# Patient Record
Sex: MALE | Race: WHITE | NOT HISPANIC OR LATINO | URBAN - METROPOLITAN AREA
[De-identification: names, ages, dates, MRNs, and addresses within clinical notes are randomized per-mention and may not be internally consistent; named-entity substitution may affect disease eponyms.]

---

## 2017-09-01 ENCOUNTER — ALLSCRIPTS OFFICE VISIT (OUTPATIENT)
Dept: OTHER | Facility: OTHER | Age: 72
End: 2017-09-01

## 2017-09-01 LAB
BILIRUB UR QL STRIP: NORMAL
CLARITY UR: NORMAL
COLOR UR: YELLOW
GLUCOSE (HISTORICAL): NORMAL
HGB UR QL STRIP.AUTO: NORMAL
KETONES UR STRIP-MCNC: NORMAL MG/DL
LEUKOCYTE ESTERASE UR QL STRIP: NORMAL
NITRITE UR QL STRIP: NORMAL
PH UR STRIP.AUTO: 5.5 [PH]
PROT UR STRIP-MCNC: NORMAL MG/DL
SP GR UR STRIP.AUTO: 1.02
UROBILINOGEN UR QL STRIP.AUTO: 0.2

## 2017-10-13 DIAGNOSIS — N40.1 ENLARGED PROSTATE WITH LOWER URINARY TRACT SYMPTOMS (LUTS): ICD-10-CM

## 2018-01-14 VITALS
WEIGHT: 187 LBS | SYSTOLIC BLOOD PRESSURE: 118 MMHG | BODY MASS INDEX: 25.33 KG/M2 | HEIGHT: 72 IN | DIASTOLIC BLOOD PRESSURE: 70 MMHG

## 2019-10-14 DIAGNOSIS — N40.1 ENLARGED PROSTATE WITH LOWER URINARY TRACT SYMPTOMS (LUTS): ICD-10-CM

## 2025-04-29 PROBLEM — H90.3 SENSORINEURAL HEARING LOSS (SNHL) OF BOTH EARS: Status: ACTIVE | Noted: 2024-10-07

## 2025-04-29 PROBLEM — N40.0 BENIGN PROSTATIC HYPERPLASIA: Status: ACTIVE | Noted: 2025-04-29

## 2025-04-29 PROBLEM — G47.33 OBSTRUCTIVE SLEEP APNEA: Status: ACTIVE | Noted: 2020-06-18

## 2025-04-29 PROBLEM — F11.90 CHRONIC, CONTINUOUS USE OF OPIOIDS: Status: ACTIVE | Noted: 2017-10-24

## 2025-04-29 PROBLEM — I48.0 PAROXYSMAL ATRIAL FIBRILLATION (HCC): Status: ACTIVE | Noted: 2019-09-09

## 2025-04-29 PROBLEM — Z97.8 PRESENCE OF INTRATHECAL PUMP: Status: ACTIVE | Noted: 2022-03-24

## 2025-04-29 PROBLEM — I72.3 COMMON ILIAC ANEURYSM (HCC): Status: ACTIVE | Noted: 2020-09-23

## 2025-04-29 PROBLEM — G62.9 NEUROPATHY: Status: ACTIVE | Noted: 2025-04-29

## 2025-06-19 ENCOUNTER — HOSPITAL ENCOUNTER (OUTPATIENT)
Dept: GASTROENTEROLOGY | Facility: HOSPITAL | Age: 80
Setting detail: OUTPATIENT SURGERY
Discharge: HOME | End: 2025-06-19
Attending: INTERNAL MEDICINE
Payer: COMMERCIAL

## 2025-06-19 ENCOUNTER — ANESTHESIA (OUTPATIENT)
Dept: GASTROENTEROLOGY | Facility: HOSPITAL | Age: 80
End: 2025-06-19
Payer: COMMERCIAL

## 2025-06-19 ENCOUNTER — ANESTHESIA EVENT (OUTPATIENT)
Dept: GASTROENTEROLOGY | Facility: HOSPITAL | Age: 80
End: 2025-06-19
Payer: COMMERCIAL

## 2025-06-19 VITALS
WEIGHT: 220 LBS | OXYGEN SATURATION: 94 % | HEART RATE: 75 BPM | TEMPERATURE: 99.1 F | BODY MASS INDEX: 29.8 KG/M2 | DIASTOLIC BLOOD PRESSURE: 62 MMHG | SYSTOLIC BLOOD PRESSURE: 114 MMHG | HEIGHT: 72 IN | RESPIRATION RATE: 16 BRPM

## 2025-06-19 DIAGNOSIS — G47.33 OBSTRUCTIVE SLEEP APNEA: ICD-10-CM

## 2025-06-19 DIAGNOSIS — Z12.11 SCREEN FOR COLON CANCER: ICD-10-CM

## 2025-06-19 DIAGNOSIS — Z79.01 CHRONIC ANTICOAGULATION: ICD-10-CM

## 2025-06-19 DIAGNOSIS — M47.9 OSTEOARTHRITIS OF SPINE, UNSPECIFIED SPINAL OSTEOARTHRITIS COMPLICATION STATUS, UNSPECIFIED SPINAL REGION: ICD-10-CM

## 2025-06-19 DIAGNOSIS — I48.0 PAROXYSMAL ATRIAL FIBRILLATION (HCC): ICD-10-CM

## 2025-06-19 DIAGNOSIS — G47.31 CENTRAL SLEEP APNEA: ICD-10-CM

## 2025-06-19 RX ORDER — SODIUM CHLORIDE, SODIUM LACTATE, POTASSIUM CHLORIDE, CALCIUM CHLORIDE 600; 310; 30; 20 MG/100ML; MG/100ML; MG/100ML; MG/100ML
INJECTION, SOLUTION INTRAVENOUS CONTINUOUS PRN
Status: DISCONTINUED | OUTPATIENT
Start: 2025-06-19 | End: 2025-06-19

## 2025-06-19 RX ORDER — PROPOFOL 10 MG/ML
INJECTION, EMULSION INTRAVENOUS AS NEEDED
Status: DISCONTINUED | OUTPATIENT
Start: 2025-06-19 | End: 2025-06-19

## 2025-06-19 RX ORDER — SODIUM CHLORIDE, SODIUM LACTATE, POTASSIUM CHLORIDE, CALCIUM CHLORIDE 600; 310; 30; 20 MG/100ML; MG/100ML; MG/100ML; MG/100ML
75 INJECTION, SOLUTION INTRAVENOUS CONTINUOUS
Status: CANCELLED | OUTPATIENT
Start: 2025-06-19

## 2025-06-19 RX ORDER — LIDOCAINE HYDROCHLORIDE 10 MG/ML
INJECTION, SOLUTION EPIDURAL; INFILTRATION; INTRACAUDAL; PERINEURAL AS NEEDED
Status: DISCONTINUED | OUTPATIENT
Start: 2025-06-19 | End: 2025-06-19

## 2025-06-19 RX ADMIN — PROPOFOL 100 MG: 10 INJECTION, EMULSION INTRAVENOUS at 08:27

## 2025-06-19 RX ADMIN — LIDOCAINE HYDROCHLORIDE 50 MG: 10 INJECTION, SOLUTION EPIDURAL; INFILTRATION; INTRACAUDAL; PERINEURAL at 08:26

## 2025-06-19 RX ADMIN — PROPOFOL 30 MG: 10 INJECTION, EMULSION INTRAVENOUS at 08:36

## 2025-06-19 RX ADMIN — SODIUM CHLORIDE, SODIUM LACTATE, POTASSIUM CHLORIDE, AND CALCIUM CHLORIDE: .6; .31; .03; .02 INJECTION, SOLUTION INTRAVENOUS at 08:43

## 2025-06-19 RX ADMIN — PROPOFOL 30 MG: 10 INJECTION, EMULSION INTRAVENOUS at 08:29

## 2025-06-19 RX ADMIN — SODIUM CHLORIDE, SODIUM LACTATE, POTASSIUM CHLORIDE, AND CALCIUM CHLORIDE: .6; .31; .03; .02 INJECTION, SOLUTION INTRAVENOUS at 08:01

## 2025-06-19 RX ADMIN — PROPOFOL 30 MG: 10 INJECTION, EMULSION INTRAVENOUS at 08:32

## 2025-06-19 NOTE — ANESTHESIA PREPROCEDURE EVALUATION
Procedure:  COLONOSCOPY    He is currently utilizing BiPAP machine at a pressure of 22/8, backup rate 5.     Stevie Herbert is a 79 y.o. male with a history tonsillar/laryngeal cancer s/p tonsillectomy, HTN, HLD, pAF, former tobacco use, depression and chronic chest pain syndrome who presents for routine follow up.   Cardiology notes from April 2025 reviewed. Pt stable.   He is doing well overall. He has gained 30lbs over the past 4-5 months and with this he has felt short of breath     Relevant Problems   CARDIO   (+) Common iliac aneurysm (HCC)   (+) Mixed hyperlipidemia   (+) Paroxysmal atrial fibrillation (HCC)      /RENAL   (+) Benign prostatic hyperplasia      MUSCULOSKELETAL   (+) Chronic midline low back pain without sciatica      NEURO/PSYCH   (+) Chronic midline low back pain without sciatica   (+) Chronic, continuous use of opioids      PULMONARY   (+) Obstructive sleep apnea      Ear/Nose/Throat   (+) Sensorineural hearing loss (SNHL) of both ears      Other   (+) Presence of intrathecal pump        Physical Exam    Airway     Mallampati score: II      Mouth opening: >= 4 cm      Cardiovascular  Cardiovascular exam normal    Dental   No notable dental hx     Pulmonary  Pulmonary exam normal Breath sounds clear to auscultation    Neurological  - normal exam  He appears oriented x3.      Other Findings        Anesthesia Plan  ASA Score- 3     Anesthesia Type- IV sedation with anesthesia with ASA Monitors.         Additional Monitors:     Airway Plan:            Plan Factors-    Chart reviewed. EKG reviewed.  Existing labs reviewed. Patient summary reviewed.    Patient is not a current smoker. Patient not instructed to abstain from smoking on day of procedure. Patient did not smoke on day of surgery.            Induction-     Postoperative Plan- .   Monitoring Plan - Monitoring plan - standard ASA monitoring      Perioperative Resuscitation Plan - Level 1 - Full Code.       Informed Consent-  Anesthetic plan and risks discussed with patient.  I personally reviewed this patient with the CRNA. Discussed and agreed on the Anesthesia Plan with the CRNA..      NPO Status:  Vitals Value Taken Time   Date of last liquid 06/18/25 06/19/25 07:39   Time of last liquid 1500 06/19/25 07:39   Date of last solid 06/17/25 06/19/25 07:39   Time of last solid 1500 06/19/25 07:39         Lab Results   Component Value Date    HGBA1C 6 (H) 07/19/2024       Lab Results   Component Value Date    K 4.2 06/18/2024     06/18/2024    CO2 26 06/18/2024    BUN 21 06/18/2024    CREATININE 1.17 06/18/2024    CALCIUM 9.4 06/18/2024    AST 19 03/05/2024    ALT 12 03/05/2024    ALKPHOS 60 03/05/2024    EGFR 64 06/18/2024     WBC 5.1 03/06/2024   HGB 11.4 (L) 03/06/2024   HCT 32.8 (L) 03/06/2024    03/06/2024       Echo 2019    Normal left ventricular chamber size. Normal left ventricular systolic     function.    Normal regional wall motion. Normal left ventricular wall thickness.    Normal right ventricular size and function.    Aortic sclerosis without stenosis. No aortic regurgitation.    Thickened mitral valve leaflets. Mitral annular calcification. Trace mitral     regurgitation.     Diastolic dysfunction with normal filling pressures.    Normal size inferior vena cava with normal inspiratory collapse consistent     with normal right atrial pressures (0-5mmhg).    Normal pericardium without effusion.    Visually Estimated LV Ejection Fraction is:65%       mpression   SINUS RHYTHM WITH OCCASIONAL PVCs. NONSPECIFIC T WAVE ABNORMALITY. ABNORMAL ECG   Confirmed By   Confirmed by Lopez Casillas ([id number]) on 3/5/2024 9:22:47 PM   Ventricular Rate EKG/MIN 87 BPM   Atrial Rate 87 BPM   TN-Interval (MSEC) 158 ms   QRS-Interval (MSEC) 82 ms   QT-Interval (MSEC) 372 ms    ms   P Axis 49 degrees   R Axis -3 degrees   T Axis 47 degrees

## 2025-06-19 NOTE — ANESTHESIA POSTPROCEDURE EVALUATION
Post-Op Assessment Note    CV Status:  Stable  Pain Score: 0    Pain management: adequate       Mental Status:  Alert and awake   Hydration Status:  Euvolemic   PONV Controlled:  Controlled   Airway Patency:  Patent     Post Op Vitals Reviewed: Yes    No anethesia notable event occurred.            Last Filed PACU Vitals:  Vitals Value Taken Time   Temp 98 845   Pulse 66    /59    Resp 16    SpO2 94

## 2025-06-19 NOTE — DISCHARGE INSTR - AVS FIRST PAGE
Please call [phone] with any problems.  Your examination today was normal.  I have suggested that further screening exams need not to be done presently.  Please restart your Eliquis today.

## 2025-06-19 NOTE — INTERVAL H&P NOTE
H&P reviewed. After examining the patient I find no changes in the patients condition since the H&P had been written.    Vitals:    06/19/25 0745   BP: 140/86   Pulse: 92   Resp: 16   Temp: 97.8 °F (36.6 °C)   SpO2: 95%